# Patient Record
Sex: FEMALE | Race: WHITE | NOT HISPANIC OR LATINO | URBAN - METROPOLITAN AREA
[De-identification: names, ages, dates, MRNs, and addresses within clinical notes are randomized per-mention and may not be internally consistent; named-entity substitution may affect disease eponyms.]

---

## 2017-01-25 ENCOUNTER — OFFICE (OUTPATIENT)
Dept: URBAN - METROPOLITAN AREA CLINIC 11 | Facility: CLINIC | Age: 37
End: 2017-01-25

## 2017-01-25 VITALS
HEIGHT: 69 IN | WEIGHT: 191 LBS | DIASTOLIC BLOOD PRESSURE: 80 MMHG | HEART RATE: 81 BPM | SYSTOLIC BLOOD PRESSURE: 128 MMHG

## 2017-01-25 DIAGNOSIS — K60.2 ANAL FISSURE, UNSPECIFIED: ICD-10-CM

## 2017-01-25 DIAGNOSIS — K62.5 HEMORRHAGE OF ANUS AND RECTUM: ICD-10-CM

## 2017-01-25 DIAGNOSIS — I82.409 ACUTE EMBOLISM AND THROMBOSIS OF UNSPECIFIED DEEP VEINS OF U: ICD-10-CM

## 2017-01-25 DIAGNOSIS — E66.3 OVERWEIGHT: ICD-10-CM

## 2017-01-25 DIAGNOSIS — R10.13 EPIGASTRIC PAIN: ICD-10-CM

## 2017-01-25 DIAGNOSIS — R19.4 CHANGE IN BOWEL HABIT: ICD-10-CM

## 2017-01-25 DIAGNOSIS — R14.0 ABDOMINAL DISTENSION (GASEOUS): ICD-10-CM

## 2017-01-25 DIAGNOSIS — K64.0 FIRST DEGREE HEMORRHOIDS: ICD-10-CM

## 2017-01-25 DIAGNOSIS — E73.9 LACTOSE INTOLERANCE, UNSPECIFIED: ICD-10-CM

## 2017-01-25 PROCEDURE — 45330 DIAGNOSTIC SIGMOIDOSCOPY: CPT | Performed by: INTERNAL MEDICINE

## 2017-01-25 PROCEDURE — 99213 OFFICE O/P EST LOW 20 MIN: CPT | Mod: 25 | Performed by: INTERNAL MEDICINE

## 2017-01-25 NOTE — SERVICEHPINOTES
Ms. Downs is a 36-year-old female here for evaluation of rectal bleeding. She has been seen in the past by Dr. Zuluaga around 2013 because of GI issues. At that time she underwent abdominal ultrasound which was normal. She states that her symptoms have overall been persistent though they have improved when she cuts dairy out of her diet. When I initially saw her in October 2016, her main issue was epigastric pain that is described as a dull ache that does not radiate. It is more an annoyance and is not very severe. It is usually associated with bloating and gas. She states that it is better when she takes a PPI but does admit that she does not take this on a regular basis. Dairy tends to make it worse but nothing else makes it worse. It is not associated with vomiting. She states that normally has a normal bowel movement 2-3 times a day but once or twice a month she will have an episode of either constipation or diarrhea. She is uncertain as to what causes the flares. She was also having persistent rectal bleeding at that time that had gone on since 2013. When I last saw her she had symptoms consistent with a fissure and so we gave her diltiazem cream which actually helped her bleeding which stopped. At that time we also scheduled her for EGD and colonoscopy because of her symptoms but this was never performed because of the patient's schedule and also because she recently broke her ankle. The patient states that when she broke her ankle she was placed on some narcotic pain meds and did have some constipation issues. She had been doing well from a bleeding standpoint up until this past week when she was found to have a DVT and was started on Lovenox and Coumadin. Since then she states that when she has a bowel movement she will have some small draping of red blood afterwards. There are no large blood clots. She denies any anorectal pain. She denies any constipation, stating that her bowel movements are soft. Overall her bowel movements have been relatively regular. She did not have any blood today on a bowel movement. Her INR was checked today and was 1.6.BR

## 2017-01-25 NOTE — SERVICENOTES
The patient's rectal bleeding appears most consistent with anal fissure.  She has point tenderness on anal exam today and diagnostic flexible sigmoidoscopy was normal to the descending colon with no colitis, mass, or polyp.  She did respond well to diltiazem the past so we will have her restart diltiazem cream.  She should take stool softeners and fiber as needed.  She should notify us of the bleeding does not get any better given her need for anticoagulation she will be at increased risk of bleeding, however if she only has occasional spotting this will be okay.  She should notify us if she has more significant bleeding.  The rest of her GI symptoms are overall unchanged.  The antispasmodic does help sounds we will have her continue this as well as repeat we can consider upper endoscopic evaluation at a later date when she is off of her anticoagulation.  We can also consider a trial of a TCA.

## 2025-07-01 ENCOUNTER — OFFICE (OUTPATIENT)
Dept: URBAN - METROPOLITAN AREA CLINIC 11 | Facility: CLINIC | Age: 45
End: 2025-07-01
Payer: MEDICARE

## 2025-07-01 VITALS
HEIGHT: 69 IN | OXYGEN SATURATION: 99 % | WEIGHT: 181 LBS | DIASTOLIC BLOOD PRESSURE: 83 MMHG | HEART RATE: 78 BPM | SYSTOLIC BLOOD PRESSURE: 117 MMHG

## 2025-07-01 DIAGNOSIS — E73.9 LACTOSE INTOLERANCE, UNSPECIFIED: ICD-10-CM

## 2025-07-01 DIAGNOSIS — R14.0 ABDOMINAL DISTENSION (GASEOUS): ICD-10-CM

## 2025-07-01 DIAGNOSIS — K90.41 NON-CELIAC GLUTEN SENSITIVITY: ICD-10-CM

## 2025-07-01 DIAGNOSIS — R19.7 DIARRHEA, UNSPECIFIED: ICD-10-CM

## 2025-07-01 DIAGNOSIS — R10.12 LEFT UPPER QUADRANT PAIN: ICD-10-CM

## 2025-07-01 DIAGNOSIS — K59.00 CONSTIPATION, UNSPECIFIED: ICD-10-CM

## 2025-07-01 DIAGNOSIS — R10.30 LOWER ABDOMINAL PAIN, UNSPECIFIED: ICD-10-CM

## 2025-07-01 PROCEDURE — 99204 OFFICE O/P NEW MOD 45 MIN: CPT | Performed by: INTERNAL MEDICINE

## 2025-07-01 RX ORDER — FAMOTIDINE 20 MG/1
40 TABLET, FILM COATED ORAL
Qty: 60 | Refills: 11 | Status: ACTIVE
Start: 2025-07-01

## 2025-07-02 LAB
C-REACTIVE PROTEIN, QUANT: <1 MG/L
ENDOMYSIAL ANTIBODY IGA: NEGATIVE
IMMUNOGLOBULIN A, QN, SERUM: 384 MG/DL — HIGH (ref 87–352)
T-TRANSGLUTAMINASE (TTG) IGA: <2 U/ML